# Patient Record
Sex: FEMALE | URBAN - NONMETROPOLITAN AREA
[De-identification: names, ages, dates, MRNs, and addresses within clinical notes are randomized per-mention and may not be internally consistent; named-entity substitution may affect disease eponyms.]

---

## 2023-09-11 ENCOUNTER — APPOINTMENT (RX ONLY)
Dept: URBAN - NONMETROPOLITAN AREA CLINIC 7 | Facility: CLINIC | Age: 88
Setting detail: DERMATOLOGY
End: 2023-09-11

## 2023-09-11 DIAGNOSIS — L81.4 OTHER MELANIN HYPERPIGMENTATION: ICD-10-CM

## 2023-09-11 DIAGNOSIS — Z71.89 OTHER SPECIFIED COUNSELING: ICD-10-CM

## 2023-09-11 DIAGNOSIS — L82.1 OTHER SEBORRHEIC KERATOSIS: ICD-10-CM

## 2023-09-11 DIAGNOSIS — L57.0 ACTINIC KERATOSIS: ICD-10-CM

## 2023-09-11 PROCEDURE — 99203 OFFICE O/P NEW LOW 30 MIN: CPT | Mod: 25

## 2023-09-11 PROCEDURE — ? LIQUID NITROGEN

## 2023-09-11 PROCEDURE — ? SUNSCREEN RECOMMENDATIONS

## 2023-09-11 PROCEDURE — 17000 DESTRUCT PREMALG LESION: CPT

## 2023-09-11 PROCEDURE — ? COUNSELING

## 2023-09-11 PROCEDURE — ? OBSERVATION

## 2023-09-11 PROCEDURE — ? EDUCATIONAL RESOURCES PROVIDED

## 2023-09-11 ASSESSMENT — LOCATION SIMPLE DESCRIPTION DERM
LOCATION SIMPLE: CHEST
LOCATION SIMPLE: RIGHT CALF
LOCATION SIMPLE: UPPER BACK
LOCATION SIMPLE: LEFT FOREARM
LOCATION SIMPLE: NOSE
LOCATION SIMPLE: ABDOMEN
LOCATION SIMPLE: RIGHT FOREARM

## 2023-09-11 ASSESSMENT — LOCATION DETAILED DESCRIPTION DERM
LOCATION DETAILED: PERIUMBILICAL SKIN
LOCATION DETAILED: RIGHT DISTAL CALF
LOCATION DETAILED: INFERIOR THORACIC SPINE
LOCATION DETAILED: LEFT PROXIMAL DORSAL FOREARM
LOCATION DETAILED: NASAL DORSUM
LOCATION DETAILED: RIGHT PROXIMAL DORSAL FOREARM
LOCATION DETAILED: LOWER STERNUM

## 2023-09-11 ASSESSMENT — LOCATION ZONE DERM
LOCATION ZONE: TRUNK
LOCATION ZONE: LEG
LOCATION ZONE: ARM
LOCATION ZONE: NOSE

## 2023-09-11 ASSESSMENT — PAIN INTENSITY VAS: HOW INTENSE IS YOUR PAIN 0 BEING NO PAIN, 10 BEING THE MOST SEVERE PAIN POSSIBLE?: NO PAIN

## 2023-09-11 NOTE — PROCEDURE: MIPS QUALITY
Quality 111:Pneumonia Vaccination Status For Older Adults: Patient received any pneumococcal conjugate or polysaccharide vaccine on or after their 60th birthday and before the end of the measurement period
Detail Level: Detailed
Quality 110: Preventive Care And Screening: Influenza Immunization: Influenza Immunization not Administered due to Patient Allergy.

## 2023-09-11 NOTE — PROCEDURE: LIQUID NITROGEN
Consent: The patient's consent was obtained including but not limited to risks of crusting, scabbing, blistering, scarring, darker or lighter pigmentary change, recurrence, incomplete removal and infection.
Show Aperture Variable?: Yes
Application Tool (Optional): Cry-AC
Render Note In Bullet Format When Appropriate: No
Post-Care Instructions: I reviewed with the patient in detail post-care instructions. Patient is to wear sunprotection, and avoid picking at any of the treated lesions. Pt may apply Vaseline to crusted or scabbing areas.\\nWHAT TO EXPECT AFTER CRYOSURGERY (LIQUID NITROGEN) TREATMENT\\n\\n\\n Liquid Nitrogen is a very cold gas. In this liquid state it has a temperature of 320 degrees below zero Fahrenheit. Dermatologists use liquid nitrogen to treat certain skin growths such as warts, seborrheic and actinic keratoses, and a whole variety of other skin tumors. These skin growths are destroyed by the freezing action of this agent. With cryosurgery we have the advantage of being able to treat many skin growths and leave very little scarring. \\n\\n From a few hours to a few days after treatment, the area may blister, turn black, or form a scab. This is a desirable result. In some, no reaction is apparent.\\n\\n 1. You are allowed to get the area wet even immediately after treatment.\\n\\n 2. Most person have little or no pain from this treatment. You may have some swelling about the eyes, if cyrotherapy is performed on the forehead or temple.\\n\\n 3. It is not necessary to cover the area with a bandage. In fact, this is undesirable. Things heal better if left open to the air. You should protect the area from injury as much as possible. \\n\\n 4. Painful large blisters (even blisters filled with blood) can occur at times. These can be opened and the fluid drained out to relieve the pain. This may be done with a needle which has been cleaned with alcohol. \\n\\n 5. As the treated area heals the unwanted skin growth will fall off. This will take several days to weeks depending on the size and nature of the growth treated, the location on the skn and the way your body heals. \\n\\n 6. Allow the growth to fall off by itself - don't pick it or pull it off.\\n\\n 7. When the growth does come off, the skin underneath will be somewhat red. As time passes it will assume the color of normal skin. Don't bandage, pick at or apply any medications to the site after the growth has fallen off. The area may be sensitive to touch and be itchy as it heals. This is normal and it may take some time before it is exactly like the skin around it once more. \\n\\n\\n If you have any questions or concerns, please contact our office:         [facility] [phone]
Duration Of Freeze Thaw-Cycle (Seconds): 3
Detail Level: Detailed
Number Of Freeze-Thaw Cycles: 1 freeze-thaw cycle

## 2024-09-30 ENCOUNTER — APPOINTMENT (RX ONLY)
Dept: URBAN - NONMETROPOLITAN AREA CLINIC 7 | Facility: CLINIC | Age: 89
Setting detail: DERMATOLOGY
End: 2024-09-30

## 2024-09-30 DIAGNOSIS — L82.1 OTHER SEBORRHEIC KERATOSIS: ICD-10-CM

## 2024-09-30 DIAGNOSIS — L81.4 OTHER MELANIN HYPERPIGMENTATION: ICD-10-CM

## 2024-09-30 PROBLEM — D48.5 NEOPLASM OF UNCERTAIN BEHAVIOR OF SKIN: Status: ACTIVE | Noted: 2024-09-30

## 2024-09-30 PROCEDURE — 99213 OFFICE O/P EST LOW 20 MIN: CPT | Mod: 25

## 2024-09-30 PROCEDURE — ? COUNSELING

## 2024-09-30 PROCEDURE — ? BIOPSY BY SHAVE METHOD

## 2024-09-30 PROCEDURE — 11102 TANGNTL BX SKIN SINGLE LES: CPT

## 2024-09-30 PROCEDURE — ? OBSERVATION

## 2024-09-30 ASSESSMENT — LOCATION DETAILED DESCRIPTION DERM
LOCATION DETAILED: INFERIOR THORACIC SPINE
LOCATION DETAILED: LEFT PROXIMAL DORSAL FOREARM
LOCATION DETAILED: RIGHT PROXIMAL DORSAL FOREARM

## 2024-09-30 ASSESSMENT — LOCATION SIMPLE DESCRIPTION DERM
LOCATION SIMPLE: UPPER BACK
LOCATION SIMPLE: LEFT FOREARM
LOCATION SIMPLE: RIGHT FOREARM

## 2024-09-30 ASSESSMENT — LOCATION ZONE DERM
LOCATION ZONE: TRUNK
LOCATION ZONE: ARM